# Patient Record
(demographics unavailable — no encounter records)

---

## 2024-11-11 NOTE — HISTORY OF PRESENT ILLNESS
[FreeTextEntry1] : 70 y/o male with ED and hypogonadism here today for follow up. The patient states that he has a good sex drive and he feels like the T treatment is working well.H Nocturia X1/2, denies hematuria, dysuria, flank pain, or any other urinary issues DL Adequately hydrating. Comes today for blood draw and f/u.  Denies FHx PCA Tobacco use: Former smoker  Last PSA: 0.81 ng/mL (11/13/2023)  Last Creatinine: 1.09 mg/dL (05/14/2023)  Last testosterone: 349 ng/dL (02/12/2024) Last UCx: negative (11/13/2023) Uro meds: Sildenafil 50mg, Transdermal Gel  Testosterone Hx: 349 ng/dL (02/12/2024) 639 ng/dL (11/13/2023) 668 ng/dL (05/14/2023) 260 ng/dL (01/22/2023) 
[FreeTextEntry1] : 70 y/o male with ED and hypogonadism here today for follow up. The patient states that he has a good sex drive and he feels like the T treatment is working well.H Nocturia X1/2, denies hematuria, dysuria, flank pain, or any other urinary issues DL Adequately hydrating. Comes today for blood draw and f/u.  Denies FHx PCA Tobacco use: Former smoker  Last PSA: 0.81 ng/mL (11/13/2023)  Last Creatinine: 1.09 mg/dL (05/14/2023)  Last testosterone: 349 ng/dL (02/12/2024) Last UCx: negative (11/13/2023) Uro meds: Sildenafil 50mg, Transdermal Gel  Testosterone Hx: 349 ng/dL (02/12/2024) 639 ng/dL (11/13/2023) 668 ng/dL (05/14/2023) 260 ng/dL (01/22/2023) 
resident, nursing, and parent

## 2024-11-11 NOTE — ASSESSMENT
[FreeTextEntry1] : 70 y/o male with ED and hypogonadism here today for follow up. The patient states that he has a good sex drive and he feels like the T treatment is working well.H Nocturia X1/2, denies hematuria, dysuria, flank pain, or any other urinary issues DL Adequately hydrating. Comes today for blood draw and f/u.  Denies FHx PCA Tobacco use: Former smoker  Last PSA: 0.81 ng/mL (11/13/2023)  Last Creatinine: 1.09 mg/dL (05/14/2023)  Last testosterone: 349 ng/dL (02/12/2024) Last UCx: negative (11/13/2023) Uro meds: Sildenafil 50mg, Transdermal Gel  Testosterone Hx: 349 ng/dL (02/12/2024) 639 ng/dL (11/13/2023) 668 ng/dL (05/14/2023) 260 ng/dL (01/22/2023)  Blood draw for PSA + renal panel + Testosterone Collecting urine for UA and Culture  Will F/U in case of positive results

## 2024-11-11 NOTE — ASSESSMENT
[FreeTextEntry1] : 72 y/o male with ED and hypogonadism here today for follow up. The patient states that he has a good sex drive and he feels like the T treatment is working well.H Nocturia X1/2, denies hematuria, dysuria, flank pain, or any other urinary issues DL Adequately hydrating. Comes today for blood draw and f/u.  Denies FHx PCA Tobacco use: Former smoker  Last PSA: 0.81 ng/mL (11/13/2023)  Last Creatinine: 1.09 mg/dL (05/14/2023)  Last testosterone: 349 ng/dL (02/12/2024) Last UCx: negative (11/13/2023) Uro meds: Sildenafil 50mg, Transdermal Gel  Testosterone Hx: 349 ng/dL (02/12/2024) 639 ng/dL (11/13/2023) 668 ng/dL (05/14/2023) 260 ng/dL (01/22/2023)  Blood draw for PSA + renal panel + Testosterone Collecting urine for UA and Culture  Will F/U in case of positive results

## 2025-05-12 NOTE — HISTORY OF PRESENT ILLNESS
[FreeTextEntry1] : 71y/o male with ED and hypogonadism here today for follow up. The patient states that he keeps having a good sex drive. Nocturia X1/2, denies hematuria, dysuria, flank pain, or any other urinary issues DL Adequately hydrating. Comes today for blood draw and f/u.  Denies FHx PCA Tobacco use: Former smoker  Last PSA: 0.83 ng/mL (11/11/2024)  Last Creatinine: 1.09 mg/dL (05/14/2023)  Last testosterone: 349 ng/dL (02/12/2024) Last UCx: negative (11/13/2023) Uro meds: Sildenafil 50mg, Transdermal Gel (2 pumps per day)  Testosterone Hx: 477 ng/dL (11/11/2024) 349 ng/dL (02/12/2024) 639 ng/dL (11/13/2023) 668 ng/dL (05/14/2023) 260 ng/dL (01/22/2023)

## 2025-05-12 NOTE — ASSESSMENT
[FreeTextEntry1] : 71y/o male with ED and hypogonadism here today for follow up. The patient states that he keeps having a good sex drive. Nocturia X1/2, denies hematuria, dysuria, flank pain, or any other urinary issues DL Adequately hydrating. Comes today for blood draw and f/u.  Denies FHx PCA Tobacco use: Former smoker  Last PSA: 0.83 ng/mL (11/11/2024)  Last Creatinine: 1.09 mg/dL (05/14/2023)  Last testosterone: 349 ng/dL (02/12/2024) Last UCx: negative (11/13/2023) Uro meds: Sildenafil 50mg, Transdermal Gel (2 pumps per day)  Testosterone Hx: 477 ng/dL (11/11/2024) 349 ng/dL (02/12/2024) 639 ng/dL (11/13/2023) 668 ng/dL (05/14/2023) 260 ng/dL (01/22/2023)  Blood draw for PSA + renal panel + Testosterone Collecting urine for UA and Culture  Will F/U in case of positive results

## 2025-07-02 NOTE — END OF VISIT
[FreeTextEntry3] : I, Dr. Falcon, personally performed the evaluation and management for this post op patient who presents today. Evaluation includes conducting the examination, assessing all conditions and establishing a plan of care moving forward. Today, the ACP, Tr Garza, was here to observe my evaluation and management services for this patient.